# Patient Record
Sex: FEMALE | Race: OTHER | NOT HISPANIC OR LATINO | Employment: OTHER | URBAN - METROPOLITAN AREA
[De-identification: names, ages, dates, MRNs, and addresses within clinical notes are randomized per-mention and may not be internally consistent; named-entity substitution may affect disease eponyms.]

---

## 2024-10-04 ENCOUNTER — HOSPITAL ENCOUNTER (EMERGENCY)
Facility: HOSPITAL | Age: 29
Discharge: HOME | End: 2024-10-04
Attending: EMERGENCY MEDICINE

## 2024-10-04 VITALS
HEART RATE: 101 BPM | HEIGHT: 60 IN | BODY MASS INDEX: 34.55 KG/M2 | TEMPERATURE: 98 F | RESPIRATION RATE: 18 BRPM | SYSTOLIC BLOOD PRESSURE: 150 MMHG | WEIGHT: 176 LBS | OXYGEN SATURATION: 99 % | DIASTOLIC BLOOD PRESSURE: 94 MMHG

## 2024-10-04 DIAGNOSIS — R45.89 ANXIOUS APPEARANCE: ICD-10-CM

## 2024-10-04 DIAGNOSIS — R07.9 CHEST PAIN: ICD-10-CM

## 2024-10-04 DIAGNOSIS — R07.9 CHEST PAIN, UNSPECIFIED TYPE: Primary | ICD-10-CM

## 2024-10-04 LAB
ALBUMIN SERPL-MCNC: 4.1 G/DL (ref 3.5–5)
ALBUMIN/GLOB SERPL: 1.1 RATIO (ref 1.1–2)
ALP SERPL-CCNC: 98 UNIT/L (ref 40–150)
ALT SERPL-CCNC: 30 UNIT/L (ref 0–55)
ANION GAP SERPL CALC-SCNC: 10 MEQ/L
AST SERPL-CCNC: 22 UNIT/L (ref 5–34)
B-HCG UR QL: NEGATIVE
BASOPHILS # BLD AUTO: 0.02 X10(3)/MCL
BASOPHILS NFR BLD AUTO: 0.2 %
BILIRUB SERPL-MCNC: 0.2 MG/DL
BILIRUB UR QL STRIP.AUTO: NEGATIVE
BUN SERPL-MCNC: 8.8 MG/DL (ref 7–18.7)
CALCIUM SERPL-MCNC: 9.7 MG/DL (ref 8.4–10.2)
CHLORIDE SERPL-SCNC: 108 MMOL/L (ref 98–107)
CLARITY UR: CLEAR
CO2 SERPL-SCNC: 25 MMOL/L (ref 22–29)
COLOR UR AUTO: NORMAL
CREAT SERPL-MCNC: 0.67 MG/DL (ref 0.55–1.02)
CREAT/UREA NIT SERPL: 13
EOSINOPHIL # BLD AUTO: 0.08 X10(3)/MCL (ref 0–0.9)
EOSINOPHIL NFR BLD AUTO: 0.9 %
ERYTHROCYTE [DISTWIDTH] IN BLOOD BY AUTOMATED COUNT: 12.6 % (ref 11.5–17)
GFR SERPLBLD CREATININE-BSD FMLA CKD-EPI: >60 ML/MIN/1.73/M2
GLOBULIN SER-MCNC: 3.7 GM/DL (ref 2.4–3.5)
GLUCOSE SERPL-MCNC: 98 MG/DL (ref 74–100)
GLUCOSE UR QL STRIP: NEGATIVE
HCT VFR BLD AUTO: 40.9 % (ref 37–47)
HGB BLD-MCNC: 13.6 G/DL (ref 12–16)
HGB UR QL STRIP: NEGATIVE
IMM GRANULOCYTES # BLD AUTO: 0.02 X10(3)/MCL (ref 0–0.04)
IMM GRANULOCYTES NFR BLD AUTO: 0.2 %
KETONES UR QL STRIP: NEGATIVE
LEUKOCYTE ESTERASE UR QL STRIP: NEGATIVE
LYMPHOCYTES # BLD AUTO: 3.19 X10(3)/MCL (ref 0.6–4.6)
LYMPHOCYTES NFR BLD AUTO: 36.5 %
MAGNESIUM SERPL-MCNC: 2.2 MG/DL (ref 1.6–2.6)
MCH RBC QN AUTO: 29.4 PG (ref 27–31)
MCHC RBC AUTO-ENTMCNC: 33.3 G/DL (ref 33–36)
MCV RBC AUTO: 88.5 FL (ref 80–94)
MONOCYTES # BLD AUTO: 0.73 X10(3)/MCL (ref 0.1–1.3)
MONOCYTES NFR BLD AUTO: 8.3 %
NEUTROPHILS # BLD AUTO: 4.71 X10(3)/MCL (ref 2.1–9.2)
NEUTROPHILS NFR BLD AUTO: 53.9 %
NITRITE UR QL STRIP: NEGATIVE
NRBC BLD AUTO-RTO: 0 %
OHS QRS DURATION: 72 MS
OHS QTC CALCULATION: 421 MS
PH UR STRIP: 7 [PH]
PLATELET # BLD AUTO: 238 X10(3)/MCL (ref 130–400)
PMV BLD AUTO: 10.5 FL (ref 7.4–10.4)
POTASSIUM SERPL-SCNC: 3.7 MMOL/L (ref 3.5–5.1)
PROT SERPL-MCNC: 7.8 GM/DL (ref 6.4–8.3)
PROT UR QL STRIP: NEGATIVE
RBC # BLD AUTO: 4.62 X10(6)/MCL (ref 4.2–5.4)
SODIUM SERPL-SCNC: 143 MMOL/L (ref 136–145)
SP GR UR STRIP.AUTO: <=1.005 (ref 1–1.03)
TROPONIN I SERPL-MCNC: <0.01 NG/ML (ref 0–0.04)
TSH SERPL-ACNC: 1.85 UIU/ML (ref 0.35–4.94)
UROBILINOGEN UR STRIP-ACNC: 0.2
WBC # BLD AUTO: 8.75 X10(3)/MCL (ref 4.5–11.5)

## 2024-10-04 PROCEDURE — 81025 URINE PREGNANCY TEST: CPT | Performed by: NURSE PRACTITIONER

## 2024-10-04 PROCEDURE — 80053 COMPREHEN METABOLIC PANEL: CPT | Performed by: NURSE PRACTITIONER

## 2024-10-04 PROCEDURE — 83735 ASSAY OF MAGNESIUM: CPT | Performed by: NURSE PRACTITIONER

## 2024-10-04 PROCEDURE — 81003 URINALYSIS AUTO W/O SCOPE: CPT | Performed by: NURSE PRACTITIONER

## 2024-10-04 PROCEDURE — 84484 ASSAY OF TROPONIN QUANT: CPT | Performed by: NURSE PRACTITIONER

## 2024-10-04 PROCEDURE — 84443 ASSAY THYROID STIM HORMONE: CPT | Performed by: NURSE PRACTITIONER

## 2024-10-04 PROCEDURE — 99285 EMERGENCY DEPT VISIT HI MDM: CPT | Mod: 25

## 2024-10-04 PROCEDURE — 93005 ELECTROCARDIOGRAM TRACING: CPT

## 2024-10-04 PROCEDURE — 25000003 PHARM REV CODE 250: Performed by: NURSE PRACTITIONER

## 2024-10-04 PROCEDURE — 93010 ELECTROCARDIOGRAM REPORT: CPT | Mod: ,,, | Performed by: INTERNAL MEDICINE

## 2024-10-04 PROCEDURE — 85025 COMPLETE CBC W/AUTO DIFF WBC: CPT | Performed by: NURSE PRACTITIONER

## 2024-10-04 RX ORDER — HYDROXYZINE PAMOATE 25 MG/1
25 CAPSULE ORAL
Status: COMPLETED | OUTPATIENT
Start: 2024-10-04 | End: 2024-10-04

## 2024-10-04 RX ADMIN — HYDROXYZINE PAMOATE 25 MG: 25 CAPSULE ORAL at 05:10

## 2024-10-04 NOTE — ED PROVIDER NOTES
"Encounter Date: 10/4/2024       History     Chief Complaint   Patient presents with    Chest Pain     Pt complaint of onset of "fast heart rate and pain in chest and left arm"  at 15:10 today.      See MDM    The history is provided by the patient. The history is limited by a language barrier. A  was used.     Review of patient's allergies indicates:  No Known Allergies  No past medical history on file.  No past surgical history on file.  No family history on file.     Review of Systems   Cardiovascular:  Positive for chest pain and palpitations.   Psychiatric/Behavioral:  The patient is nervous/anxious.    All other systems reviewed and are negative.      Physical Exam     Initial Vitals [10/04/24 1604]   BP Pulse Resp Temp SpO2   (!) 150/94 101 18 98.2 °F (36.8 °C) 99 %      MAP       --         Physical Exam    Nursing note and vitals reviewed.  Constitutional: She appears well-developed and well-nourished.   tearful   Eyes: Conjunctivae are normal.   Cardiovascular:  Normal rate, regular rhythm, normal heart sounds and intact distal pulses.           Pulmonary/Chest: Breath sounds normal. No respiratory distress.   Abdominal: Abdomen is soft. Bowel sounds are normal. She exhibits no distension. There is no abdominal tenderness.   obese   Musculoskeletal:         General: Normal range of motion.     Neurological: She is alert and oriented to person, place, and time.   Skin: Skin is warm and dry.   Psychiatric: She has a normal mood and affect.   nervousness         ED Course   Procedures  Labs Reviewed   CBC WITH DIFFERENTIAL - Abnormal       Result Value    WBC 8.75      RBC 4.62      Hgb 13.6      Hct 40.9      MCV 88.5      MCH 29.4      MCHC 33.3      RDW 12.6      Platelet 238      MPV 10.5 (*)     Neut % 53.9      Lymph % 36.5      Mono % 8.3      Eos % 0.9      Basophil % 0.2      Lymph # 3.19      Neut # 4.71      Mono # 0.73      Eos # 0.08      Baso # 0.02      IG# 0.02      IG% 0.2  "     NRBC% 0.0     COMPREHENSIVE METABOLIC PANEL - Abnormal    Sodium 143      Potassium 3.7      Chloride 108 (*)     CO2 25      Glucose 98      Blood Urea Nitrogen 8.8      Creatinine 0.67      Calcium 9.7      Protein Total 7.8      Albumin 4.1      Globulin 3.7 (*)     Albumin/Globulin Ratio 1.1      Bilirubin Total 0.2      ALP 98      ALT 30      AST 22      eGFR >60      Anion Gap 10.0      BUN/Creatinine Ratio 13     MAGNESIUM - Normal    Magnesium Level 2.20     TSH - Normal    TSH 1.854     URINALYSIS, REFLEX TO URINE CULTURE - Normal    Color, UA Straw      Appearance, UA Clear      Specific Gravity, UA <=1.005      pH, UA 7.0      Protein, UA Negative      Glucose, UA Negative      Ketones, UA Negative      Blood, UA Negative      Bilirubin, UA Negative      Urobilinogen, UA 0.2      Nitrites, UA Negative      Leukocyte Esterase, UA Negative     PREGNANCY TEST, URINE RAPID - Normal    hCG Qualitative, Urine Negative     TROPONIN I - Normal    Troponin-I <0.010       EKG Readings: (Independently Interpreted)   Initial Reading: No STEMI. Rhythm: Sinus Tachycardia. Heart Rate: 107. Ectopy: No Ectopy. ST Segments: Normal ST Segments. T Waves Flipped: III. Clinical Impression: Sinus Tachycardia     ECG Results              EKG 12-lead (In process)        Collection Time Result Time QRS Duration OHS QTC Calculation    10/04/24 15:58:59 10/04/24 16:06:58 72 421                     In process by Interface, Lab In [facility] (10/04/24 16:07:08)                   Narrative:    Test Reason : R07.9,    Vent. Rate : 107 BPM     Atrial Rate : 107 BPM     P-R Int : 140 ms          QRS Dur : 072 ms      QT Int : 316 ms       P-R-T Axes : 047 010 017 degrees     QTc Int : 421 ms    Sinus tachycardia  Minimal voltage criteria for LVH, may be normal variant  Borderline Abnormal ECG  No previous ECGs available    Referred By: [email or web address]   SELF           Confirmed By:                                   Imaging Results               X-Ray Chest 1 View (Final result)  Result time 10/04/24 16:59:47      Final result by Vianca Canseco MD (10/04/24 16:59:47)                   Impression:      No acute abnormality of the chest.      Electronically signed by: Vianca Canseco  Date:    10/04/2024  Time:    16:59               Narrative:    EXAMINATION:  XR CHEST 1 VIEW    CLINICAL HISTORY:  chest pain;    TECHNIQUE:  AP chest    COMPARISON:  Chest x-ray dated 04/24/2021    FINDINGS:  The heart is normal size.  The lungs are clear.  There is no pleural effusion or visible pneumothorax.                                       Medications   hydrOXYzine pamoate capsule 25 mg (25 mg Oral Given 10/4/24 1725)     Medical Decision Making  30 y/o [place] speaking female presents with chest pain and palpitations starting while at work about 30-40 minutes PTA. No n/v/d. Felt a little nervous after pain started and lightheaded. No sob. Pain went to left arm. She denies any pmhx. No cough/congestion. She does not smoke/drink/drugs. Lmp (on hormone shots for birth control ). She currently doesn't have any pain.     EKG shows no acute findings. Labs unremarkable for acute findings. UA clean. Xray normal.     Patient is feeling better. Discussed all results with . Discussed f/u with pcp. She did mention she is stressed with work so question whether this is more stress/anxiety related over true cardiac.         Amount and/or Complexity of Data Reviewed  Labs: ordered. Decision-making details documented in ED Course.  Radiology: ordered. Decision-making details documented in ED Course.  ECG/medicine tests: independent interpretation performed. Decision-making details documented in ED Course.    Risk  Prescription drug management.      Additional MDM:   Differential Diagnosis:   Other: The following diagnoses were also considered and will be evaluated: anxiety, chest pain and nstemi.                                   Clinical Impression:  Final  diagnoses:  [R07.9] Chest pain  [R07.9] Chest pain, unspecified type (Primary)  [R45.89] Anxious appearance          ED Disposition Condition    Discharge Stable          ED Prescriptions    None       Follow-up Information       Follow up With Specialties Details Why Contact Info    primary care provider  Call in 1 week As needed [phone]             Loren Sheridan FNP  10/04/24 9545

## 2024-10-04 NOTE — DISCHARGE INSTRUCTIONS
Tylenol or motrin for discomfort as needed. Follow up with primary care provider [phone]. Return to ER if symptoms change/worsen.